# Patient Record
Sex: MALE | Race: WHITE | Employment: FULL TIME | URBAN - METROPOLITAN AREA
[De-identification: names, ages, dates, MRNs, and addresses within clinical notes are randomized per-mention and may not be internally consistent; named-entity substitution may affect disease eponyms.]

---

## 2019-01-03 ENCOUNTER — HOSPITAL ENCOUNTER (EMERGENCY)
Age: 34
Discharge: HOME | End: 2019-01-03
Attending: STUDENT IN AN ORGANIZED HEALTH CARE EDUCATION/TRAINING PROGRAM
Payer: SELF-PAY

## 2019-01-03 VITALS
DIASTOLIC BLOOD PRESSURE: 98 MMHG | OXYGEN SATURATION: 98 % | SYSTOLIC BLOOD PRESSURE: 144 MMHG | HEART RATE: 83 BPM | HEIGHT: 66 IN | BODY MASS INDEX: 28.77 KG/M2 | WEIGHT: 179 LBS | RESPIRATION RATE: 16 BRPM | TEMPERATURE: 98.7 F

## 2019-01-03 DIAGNOSIS — H02.823 CYST OF RIGHT EYELID: Primary | ICD-10-CM

## 2019-01-03 PROCEDURE — 74011000250 HC RX REV CODE- 250: Performed by: PHYSICIAN ASSISTANT

## 2019-01-03 PROCEDURE — 99282 EMERGENCY DEPT VISIT SF MDM: CPT

## 2019-01-03 RX ORDER — TETRACAINE HYDROCHLORIDE 5 MG/ML
1 SOLUTION OPHTHALMIC
Status: COMPLETED | OUTPATIENT
Start: 2019-01-03 | End: 2019-01-03

## 2019-01-03 RX ADMIN — FLUORESCEIN SODIUM 1 STRIP: 1 STRIP OPHTHALMIC at 11:00

## 2019-01-03 RX ADMIN — TETRACAINE HYDROCHLORIDE 1 DROP: 5 SOLUTION OPHTHALMIC at 11:00

## 2019-01-03 NOTE — DISCHARGE INSTRUCTIONS
Patient Education   - make a follow up with [address] y chalaziones: Instrucciones de cuidado - [ Styes and Chalazia: Care Instructions ]  Instrucciones de cuidado    Los orzuelos y chalaziones (calacios) son afecciones que pueden causar hinchazón del párpado. Un orzuelo es ramone infección en la raíz de ramone pestaña. La infección causa un bulto sensible y godfrey en el borde del párpado. La infección se puede extender hasta que todo el párpado se ponga godfrey y se inflame. El orzuelo por lo general revienta y escurre ramone pequeña cantidad de pus. Por lo general desaparecen por sí mismos en ramone semana más o menos, roque en ocasiones requieren tratamiento con antibióticos. Un chalazión es un bulto o quiste en el párpado. Es provocado por la hinchazón e inflamación de las glándulas sebáceas profundas que están dentro del párpado. Los chalaziones no suelen infectarse. Pueden rea unos meses para sanar. Si el chalazión se inflama y duele más o si no desaparece, es posible que necesite ser drenado por un médico.  La atención de seguimiento es ramone parte clave de velarde tratamiento y seguridad. Asegúrese de hacer y acudir a todas las citas, y llame a velarde médico si está teniendo problemas. También es ramone buena idea saber los resultados de grzegorz exámenes y mantener ramone lista de los medicamentos que mio. ¿Cómo puede cuidarse en el hogar? · No se restriegue los ojos. No se exprima ni trate de abrir un orzuelo o un chalazión. · Para ayudar a que sane más pronto un orzuelo o chalazión:  ? Póngase ramone compresa húmeda y tibia en el padmini gloria 5 a 10 minutos, 3 a 6 veces al día. El calor a menudo lleva al orzuelo a un punto en que se drena por sí solo. Tenga en cuenta que la aplicación de compresas tibias muchas veces aumenta un poco la hinchazón al principio. ? No use [place], ni caliente ramone toallita húmeda en el horno microondas. La compresa podría calentarse demasiado y quemarle el párpado.   · Dutch Barbone siempre las kwasi antes y después de usar ramone compresa o tocarse los ojos. · Si el médico le vince gotas o un ungüento antibióticos, úselos exactamente brooks se lo haya indicado. Use el medicamento todo el tiempo indicado, aunque el padmini empiece a sentirse mejor. · Para ponerse ungüento o gotas para los ojos:  ? Incline la [place] atrás y, con un dedo, baje el párpado inferior. ? Deje caer las gotas o un chorrito del medicamento dentro del párpado inferior. ? Cierre el padmini gloria 30 a 61 segundos para permitir que las gotas o el ungüento se esparzan. ? No permita que la punta del ungüento o del gotero toque grzegorz pestañas ni otra superficie. · No utilice maquillaje en los ojos ni lentes de contacto hasta que el orzuelo o el chalazión hayan sanado. · Mientras tenga el orzuelo no comparta toallas, almohadas ni toallitas para la rd. ¿Cuándo debe pedir ayuda? Llame a velarde médico ahora mismo o busque atención médica inmediata si:    · Siente dolor en el padmini.     · Tiene cambios o pérdida de la visión.     · El enrojecimiento y la hinchazón empeoran mucho.    Preste especial atención a los cambios en velarde ama y asegúrese de comunicarse con velarde médico si:    · El orzuelo no mejora en 1 semana.     · El chalazión no empieza a mejorar después de varias semanas. ¿Dónde puede encontrar más información en inglés? Jatin Oquendo a [email or web address] Leslee Jianger [id number] en la búsqueda para aprender más acerca de \"Orzuelos y chalaziones: Instrucciones de cuidado - [ Styes and Georgette: Care Instructions ]. \"  Revisado: 3 diciembre, 2017  Versión del contenido: 11.8  © 6853-1122 [employer]. Las instrucciones de cuidado fueron adaptadas bajo licencia por [employer] (which disclaims liability or warranty for this information). Si usted tiene [place] afección médica o sobre estas instrucciones, siempre pregunte a velarde profesional de ama.  [employer] niega toda garantía o responsabilidad por velarde uso de esta información.

## 2022-09-13 NOTE — ED TRIAGE NOTES
Using  phone: Patient reports having bumped on his eyelid for over three months. Denies any acute changes today. \"It bothers me all the time. Its uncomfortable\" Denies change in vision
13-Sep-2022 08:30